# Patient Record
Sex: FEMALE | Race: BLACK OR AFRICAN AMERICAN | Employment: FULL TIME | URBAN - METROPOLITAN AREA
[De-identification: names, ages, dates, MRNs, and addresses within clinical notes are randomized per-mention and may not be internally consistent; named-entity substitution may affect disease eponyms.]

---

## 2021-11-23 ENCOUNTER — NURSE ONLY (OUTPATIENT)
Dept: INTERNAL MEDICINE CLINIC | Facility: HOSPITAL | Age: 41
End: 2021-11-23
Attending: EMERGENCY MEDICINE

## 2021-11-23 DIAGNOSIS — Z00.00 WELLNESS EXAMINATION: Primary | ICD-10-CM

## 2021-11-23 PROCEDURE — 86735 MUMPS ANTIBODY: CPT

## 2021-11-23 PROCEDURE — 86787 VARICELLA-ZOSTER ANTIBODY: CPT

## 2021-11-23 PROCEDURE — 86762 RUBELLA ANTIBODY: CPT

## 2021-11-23 PROCEDURE — 86480 TB TEST CELL IMMUN MEASURE: CPT

## 2021-11-23 PROCEDURE — 86765 RUBEOLA ANTIBODY: CPT

## 2021-12-13 ENCOUNTER — LAB ENCOUNTER (OUTPATIENT)
Dept: LAB | Age: 41
End: 2021-12-13
Attending: PREVENTIVE MEDICINE
Payer: MEDICAID

## 2021-12-13 ENCOUNTER — TELEPHONE (OUTPATIENT)
Dept: INTERNAL MEDICINE CLINIC | Facility: HOSPITAL | Age: 41
End: 2021-12-13

## 2021-12-13 DIAGNOSIS — Z00.00 ROUTINE GENERAL MEDICAL EXAMINATION AT A HEALTH CARE FACILITY: ICD-10-CM

## 2021-12-13 DIAGNOSIS — Z00.00 ROUTINE GENERAL MEDICAL EXAMINATION AT A HEALTH CARE FACILITY: Primary | ICD-10-CM

## 2021-12-16 ENCOUNTER — HOSPITAL ENCOUNTER (EMERGENCY)
Facility: HOSPITAL | Age: 41
Discharge: HOME | End: 2021-12-16
Attending: EMERGENCY MEDICINE
Payer: MEDICAID

## 2021-12-16 VITALS
WEIGHT: 216 LBS | HEIGHT: 64 IN | BODY MASS INDEX: 36.88 KG/M2 | RESPIRATION RATE: 20 BRPM | TEMPERATURE: 98 F | OXYGEN SATURATION: 98 % | DIASTOLIC BLOOD PRESSURE: 87 MMHG | HEART RATE: 66 BPM | SYSTOLIC BLOOD PRESSURE: 148 MMHG

## 2021-12-16 DIAGNOSIS — K80.50 BILIARY COLIC: Primary | ICD-10-CM

## 2021-12-16 PROCEDURE — 81003 URINALYSIS AUTO W/O SCOPE: CPT | Performed by: EMERGENCY MEDICINE

## 2021-12-16 PROCEDURE — 85025 COMPLETE CBC W/AUTO DIFF WBC: CPT | Performed by: EMERGENCY MEDICINE

## 2021-12-16 PROCEDURE — 80076 HEPATIC FUNCTION PANEL: CPT | Performed by: EMERGENCY MEDICINE

## 2021-12-16 PROCEDURE — 80048 BASIC METABOLIC PNL TOTAL CA: CPT | Performed by: EMERGENCY MEDICINE

## 2021-12-16 PROCEDURE — 83690 ASSAY OF LIPASE: CPT | Performed by: EMERGENCY MEDICINE

## 2021-12-16 PROCEDURE — 99284 EMERGENCY DEPT VISIT MOD MDM: CPT

## 2021-12-16 PROCEDURE — 96375 TX/PRO/DX INJ NEW DRUG ADDON: CPT

## 2021-12-16 PROCEDURE — 96374 THER/PROPH/DIAG INJ IV PUSH: CPT

## 2021-12-16 PROCEDURE — 81025 URINE PREGNANCY TEST: CPT

## 2021-12-16 PROCEDURE — 93005 ELECTROCARDIOGRAM TRACING: CPT

## 2021-12-16 PROCEDURE — 96361 HYDRATE IV INFUSION ADD-ON: CPT

## 2021-12-16 PROCEDURE — 93010 ELECTROCARDIOGRAM REPORT: CPT | Performed by: EMERGENCY MEDICINE

## 2021-12-16 RX ORDER — ALBUTEROL SULFATE 90 UG/1
AEROSOL, METERED RESPIRATORY (INHALATION) EVERY 6 HOURS PRN
COMMUNITY

## 2021-12-16 RX ORDER — HYDROCHLOROTHIAZIDE 25 MG/1
TABLET ORAL
COMMUNITY
Start: 2021-12-01

## 2021-12-16 RX ORDER — ONDANSETRON 2 MG/ML
4 INJECTION INTRAMUSCULAR; INTRAVENOUS ONCE
Status: COMPLETED | OUTPATIENT
Start: 2021-12-16 | End: 2021-12-16

## 2021-12-16 RX ORDER — BUDESONIDE AND FORMOTEROL FUMARATE DIHYDRATE 160; 4.5 UG/1; UG/1
2 AEROSOL RESPIRATORY (INHALATION) 2 TIMES DAILY
COMMUNITY
Start: 2021-12-09

## 2021-12-16 RX ORDER — HYDROCODONE BITARTRATE AND ACETAMINOPHEN 5; 325 MG/1; MG/1
1 TABLET ORAL EVERY 6 HOURS PRN
Qty: 16 TABLET | Refills: 0 | Status: SHIPPED | OUTPATIENT
Start: 2021-12-16 | End: 2021-12-23

## 2021-12-16 RX ORDER — MORPHINE SULFATE 4 MG/ML
4 INJECTION, SOLUTION INTRAMUSCULAR; INTRAVENOUS ONCE
Status: DISCONTINUED | OUTPATIENT
Start: 2021-12-16 | End: 2021-12-16

## 2021-12-16 RX ORDER — PANTOPRAZOLE SODIUM 40 MG/1
40 TABLET, DELAYED RELEASE ORAL DAILY
COMMUNITY
Start: 2021-04-24

## 2021-12-16 RX ORDER — ONDANSETRON 4 MG/1
4 TABLET, ORALLY DISINTEGRATING ORAL EVERY 4 HOURS PRN
Qty: 10 TABLET | Refills: 0 | Status: SHIPPED | OUTPATIENT
Start: 2021-12-16 | End: 2021-12-23

## 2021-12-16 NOTE — ED INITIAL ASSESSMENT (HPI)
Patient states she has a hx of gallstones and it feels like they are \"flaring up\". Patient having R upper quadrant pain.

## 2021-12-16 NOTE — ED PROVIDER NOTES
Patient Seen in: [facility] AND [facility] Emergency Department      History   Patient presents with:  Difficulty Breathing    Stated Complaint: sob     Subjective:   HPI    78-year-old female with past medical history significant for hypertension gallstones, pr rales, wheezes. No chest wall tenderness  Abdominal: Moderate right upper quadrant tenderness to palpation without rebound or guarding. Nondistended. Soft. Bowel sounds are normal.   Back:   : Musculoskeletal: Normal range of motion. No deformity. Ashley Lewis MD  [address] [facility]  [address] [place] [address]  [phone]    Call in 2 days            Medications Prescribed:  Current Discharge Medication List    START taking these medications    ondansetron 4 MG Oral Tablet Dispersible  Take 1 tablet (4 mg t

## 2021-12-20 ENCOUNTER — LAB ENCOUNTER (OUTPATIENT)
Dept: LAB | Age: 41
End: 2021-12-20
Attending: PREVENTIVE MEDICINE

## 2021-12-20 DIAGNOSIS — Z00.00 ROUTINE GENERAL MEDICAL EXAMINATION AT A HEALTH CARE FACILITY: ICD-10-CM

## 2021-12-27 ENCOUNTER — LAB ENCOUNTER (OUTPATIENT)
Dept: LAB | Age: 41
End: 2021-12-27
Attending: PREVENTIVE MEDICINE
Payer: MEDICAID

## 2021-12-27 DIAGNOSIS — Z00.00 ROUTINE GENERAL MEDICAL EXAMINATION AT A HEALTH CARE FACILITY: ICD-10-CM

## 2021-12-28 ENCOUNTER — TELEPHONE (OUTPATIENT)
Dept: INTERNAL MEDICINE CLINIC | Facility: HOSPITAL | Age: 41
End: 2021-12-28

## 2021-12-28 NOTE — TELEPHONE ENCOUNTER
Outside Covid Testing done    Results and RTW guidelines:    COVID RESULT reported:      Test type:    [] Rapid outside         [x] PCR outside    Date of test:12/27/21    Test location: [address] [place] [address]       [x] Result viewed in Epic with verbal consen Still has a fever, vomiting or diarrhea   - Keep communication open with management about RTW and if symptoms worsen     Notes:     RTW PLAN:    [x] RTW 10 days with clearance from [facility]- call for appt 2 days prior to RTW date OR when feeling well enough to R • Cough                          Yes []      • Shortness of breath  Yes []      • Congestion                 Yes []      • Runny nose                Yes []        • Loss of Smell              Yes [x]       •  Loss of Taste             Yes [x]       • Sor

## 2022-04-18 ENCOUNTER — LAB ENCOUNTER (OUTPATIENT)
Dept: LAB | Facility: HOSPITAL | Age: 42
End: 2022-04-18
Attending: PREVENTIVE MEDICINE
Payer: COMMERCIAL

## 2022-04-18 LAB — SARS-COV-2 RNA RESP QL NAA+PROBE: NOT DETECTED

## 2022-04-25 ENCOUNTER — LAB ENCOUNTER (OUTPATIENT)
Dept: LAB | Facility: HOSPITAL | Age: 42
End: 2022-04-25
Attending: PREVENTIVE MEDICINE
Payer: COMMERCIAL

## 2022-04-25 LAB — SARS-COV-2 RNA RESP QL NAA+PROBE: NOT DETECTED

## 2022-05-03 ENCOUNTER — LAB ENCOUNTER (OUTPATIENT)
Dept: LAB | Facility: HOSPITAL | Age: 42
End: 2022-05-03
Attending: PREVENTIVE MEDICINE
Payer: COMMERCIAL

## 2022-05-04 LAB — SARS-COV-2 RNA RESP QL NAA+PROBE: NOT DETECTED
